# Patient Record
Sex: FEMALE | Race: WHITE | NOT HISPANIC OR LATINO | Employment: FULL TIME | URBAN - METROPOLITAN AREA
[De-identification: names, ages, dates, MRNs, and addresses within clinical notes are randomized per-mention and may not be internally consistent; named-entity substitution may affect disease eponyms.]

---

## 2017-06-19 ENCOUNTER — TRANSCRIBE ORDERS (OUTPATIENT)
Dept: ADMINISTRATIVE | Facility: HOSPITAL | Age: 42
End: 2017-06-19

## 2017-06-19 ENCOUNTER — TRANSCRIBE ORDERS (OUTPATIENT)
Dept: INTERNAL MEDICINE | Facility: CLINIC | Age: 42
End: 2017-06-19

## 2017-06-19 DIAGNOSIS — Z12.31 VISIT FOR SCREENING MAMMOGRAM: Primary | ICD-10-CM

## 2017-07-12 ENCOUNTER — APPOINTMENT (OUTPATIENT)
Dept: MAMMOGRAPHY | Facility: HOSPITAL | Age: 42
End: 2017-07-12

## 2017-07-13 ENCOUNTER — HOSPITAL ENCOUNTER (OUTPATIENT)
Dept: MAMMOGRAPHY | Facility: HOSPITAL | Age: 42
Discharge: HOME | End: 2017-07-13
Admitting: INTERNAL MEDICINE

## 2017-07-13 DIAGNOSIS — Z12.31 VISIT FOR SCREENING MAMMOGRAM: ICD-10-CM

## 2017-07-13 PROCEDURE — G0202 SCR MAMMO BI INCL CAD: HCPCS

## 2018-06-04 ENCOUNTER — TRANSCRIBE ORDERS (OUTPATIENT)
Dept: ADMINISTRATIVE | Facility: HOSPITAL | Age: 43
End: 2018-06-04

## 2018-06-04 DIAGNOSIS — Z12.31 VISIT FOR SCREENING MAMMOGRAM: Primary | ICD-10-CM

## 2018-07-20 ENCOUNTER — HOSPITAL ENCOUNTER (OUTPATIENT)
Dept: MAMMOGRAPHY | Facility: HOSPITAL | Age: 43
Discharge: HOME | End: 2018-07-20
Admitting: INTERNAL MEDICINE

## 2018-07-20 DIAGNOSIS — Z12.31 VISIT FOR SCREENING MAMMOGRAM: ICD-10-CM

## 2018-07-20 PROCEDURE — 77067 SCR MAMMO BI INCL CAD: CPT

## 2019-02-27 ENCOUNTER — TELEPHONE (OUTPATIENT)
Dept: INTERNAL MEDICINE | Facility: CLINIC | Age: 44
End: 2019-02-27

## 2019-02-27 NOTE — TELEPHONE ENCOUNTER
Pt states she knows that she hasn't seen you in a while.       She was doing 50mg of zoloft and went ahead and started taking 100mg.   Was just told this weekend her  wants a divorce, he has been seeing somebody since October, she has three children.     Her anxiety is izabel high, she is visibly shaking most of the time.

## 2019-02-27 NOTE — TELEPHONE ENCOUNTER
Pt informed, via voicemail, that we have a 9 am, went a head and scheduled her so she would have the apt, but informed she could call back and cancel or reschedule at any time without penalty.

## 2019-02-28 ENCOUNTER — OFFICE VISIT (OUTPATIENT)
Dept: INTERNAL MEDICINE | Facility: CLINIC | Age: 44
End: 2019-02-28

## 2019-02-28 VITALS
HEART RATE: 75 BPM | DIASTOLIC BLOOD PRESSURE: 64 MMHG | RESPIRATION RATE: 18 BRPM | SYSTOLIC BLOOD PRESSURE: 104 MMHG | HEIGHT: 72 IN | OXYGEN SATURATION: 99 % | BODY MASS INDEX: 22.21 KG/M2 | WEIGHT: 164 LBS

## 2019-02-28 DIAGNOSIS — F43.23 SITUATIONAL MIXED ANXIETY AND DEPRESSIVE DISORDER: Primary | ICD-10-CM

## 2019-02-28 PROCEDURE — 99213 OFFICE O/P EST LOW 20 MIN: CPT | Performed by: INTERNAL MEDICINE

## 2019-02-28 RX ORDER — HYDROXYZINE HYDROCHLORIDE 25 MG/1
25 TABLET, FILM COATED ORAL 3 TIMES DAILY PRN
Qty: 30 TABLET | Refills: 3 | Status: SHIPPED | OUTPATIENT
Start: 2019-02-28

## 2019-02-28 RX ORDER — SERTRALINE HYDROCHLORIDE 100 MG/1
150 TABLET, FILM COATED ORAL DAILY
Qty: 45 TABLET | Refills: 3 | Status: SHIPPED | OUTPATIENT
Start: 2019-02-28 | End: 2019-07-19 | Stop reason: SDUPTHER

## 2019-02-28 RX ORDER — ALPRAZOLAM 0.25 MG/1
0.25 TABLET ORAL EVERY 6 HOURS PRN
Qty: 3 TABLET | Refills: 2 | Status: SHIPPED | OUTPATIENT
Start: 2019-02-28

## 2019-02-28 NOTE — PROGRESS NOTES
Chief Complaint  Mary Chandra is a 43 y.o. female who presents for Anxiety and Depression  .    History of Present Illness   Mary is here for acute care.  She has had a very emotionally traumatic weekend.  Her  out of the blue told her he was cheating on her and asked for a divorce.   They have been  17 years, have three kids.  They had been doing some counselling but she didn't think this was anywhere on the radar.  She feels completely blindsided.  She has been on sertraline and is wondering if we need to increase it or add something.  She is having near panic attacks.  She can't sleep. She can't eat.      Review of Systems   Psychiatric/Behavioral: Positive for depression. The patient has insomnia and is nervous/anxious.        Patient Active Problem List   Diagnosis   • Anxiety   • Chronic constipation   • BRBPR (bright red blood per rectum)   • Frequent headaches   • Situational mixed anxiety and depressive disorder       Past Medical History:   Diagnosis Date   • Anxiety    • Chronic constipation    • Headache    • Precancerous skin lesion     Dr. Andino       Past Surgical History:   Procedure Laterality Date   • BUNIONECTOMY Right        Family History   Problem Relation Age of Onset   • No Known Problems Mother    • Heart disease Father         pacemaker   • Ovarian cancer Cousin        Social History     Socioeconomic History   • Marital status:      Spouse name: Not on file   • Number of children: Not on file   • Years of education: Not on file   • Highest education level: Not on file   Social Needs   • Financial resource strain: Not on file   • Food insecurity - worry: Not on file   • Food insecurity - inability: Not on file   • Transportation needs - medical: Not on file   • Transportation needs - non-medical: Not on file   Occupational History   • Not on file   Tobacco Use   • Smoking status: Never Smoker   Substance and Sexual Activity   • Alcohol use: Yes     Comment:  "socially   • Drug use: No   • Sexual activity: Not on file   Other Topics Concern   • Not on file   Social History Narrative   • Not on file       Current Outpatient Medications on File Prior to Visit   Medication Sig Dispense Refill   • [DISCONTINUED] sertraline (ZOLOFT) 100 MG tablet 50 mg. Take half tab daily        No current facility-administered medications on file prior to visit.        No Known Allergies    Vitals:    02/28/19 0921   BP: 104/64   Pulse: 75   Resp: 18   SpO2: 99%   Weight: 74.4 kg (164 lb)   Height: 182.9 cm (72\")       Body mass index is 22.24 kg/m².    Objective   Physical Exam   Constitutional: She is oriented to person, place, and time. She appears well-developed and well-nourished. No distress.   HENT:   Head: Normocephalic and atraumatic.   Neurological: She is alert and oriented to person, place, and time. No cranial nerve deficit.   Psychiatric: Her speech is normal and behavior is normal. Judgment and thought content normal. Cognition and memory are normal. She exhibits a depressed mood.       Results for orders placed or performed in visit on 07/06/16   Comprehensive metabolic panel   Result Value Ref Range    Glucose 77 65 - 99 mg/dL    BUN 13 6 - 20 mg/dL    Creatinine 0.72 0.57 - 1.00 mg/dL    eGFR Non African Am 90 >60 mL/min/1.73    eGFR African Am 109 >60 mL/min/1.73    BUN/Creatinine Ratio 18.1 7.0 - 25.0    Sodium 142 136 - 145 mmol/L    Potassium 4.7 3.5 - 5.2 mmol/L    Chloride 103 98 - 107 mmol/L    Total CO2 25.0 22.0 - 29.0 mmol/L    Calcium 9.7 8.6 - 10.5 mg/dL    Total Protein 7.2 6.0 - 8.5 g/dL    Albumin 4.80 3.50 - 5.20 g/dL    Globulin 2.4 gm/dL    A/G Ratio 2.0 g/dL    Total Bilirubin 0.6 0.1 - 1.2 mg/dL    Alkaline Phosphatase 65 39 - 117 U/L    AST (SGOT) 18 1 - 32 U/L    ALT (SGPT) 13 1 - 33 U/L   Hemoglobin A1c   Result Value Ref Range    Hemoglobin A1C 4.84 4.80 - 5.60 %   Lipid panel   Result Value Ref Range    Total Cholesterol 215 (H) 0 - 200 mg/dL    " Triglycerides 80 0 - 150 mg/dL    HDL Cholesterol 89 (H) 40 - 60 mg/dL    VLDL Cholesterol 16 5 - 40 mg/dL    LDL Cholesterol  110 (H) 0 - 100 mg/dL   TSH   Result Value Ref Range    TSH 2.190 0.270 - 4.200 mIU/mL   Vitamin B12   Result Value Ref Range    Vitamin B-12 542 211 - 946 pg/mL   Vitamin D 25 hydroxy   Result Value Ref Range    25 Hydroxy, Vitamin D 44.0 ng/mL   Urinalysis w/ Culture if Indicated   Result Value Ref Range    Specific Gravity, UA 1.013 1.005 - 1.030    pH, UA 7.5 5.0 - 7.5    Color, UA Yellow Yellow    Appearance, UA Clear Clear    Leukocytes, UA Negative Negative    Protein Negative Negative/Trace    Glucose, UA Negative Negative    Ketones Negative Negative    Blood, UA Negative Negative    Bilirubin, UA Negative Negative    Urobilinogen, UA 0.2 0.2 - 1.0 mg/dL    Nitrite, UA Negative Negative    Microscopic Examination Comment     Microscopic Examination See below:     Urinalysis Reflex Comment    Microscopic Examination   Result Value Ref Range    WBC 0-5 0 - 5 /hpf    RBC, UA 0-2 0 - 2 /hpf    Epithelial Cells (non renal) 0-10 0 - 10 /hpf    Mucus, UA Present Not Estab. /hpf    Bacteria, UA Few None seen/Few /hpf   CBC and Differential   Result Value Ref Range    WBC 7.08 4.50 - 10.70 10*3/mm3    RBC 4.59 3.90 - 5.20 10*6/mm3    Hemoglobin 14.5 11.9 - 15.5 g/dL    Hematocrit 44.7 35.6 - 45.5 %    MCV 97.4 80.5 - 98.2 fL    MCH 31.6 26.9 - 32.7 pg    MCHC 32.4 32.4 - 36.3 g/dL    RDW 12.7 11.7 - 13.0 %    Platelets 250 140 - 500 10*3/mm3    Neutrophil Rel % 57.2 42.7 - 76.0 %    Lymphocyte Rel % 34.7 19.6 - 45.3 %    Monocyte Rel % 6.2 5.0 - 12.0 %    Eosinophil Rel % 1.6 0.3 - 6.2 %    Basophil Rel % 0.3 0.0 - 1.5 %    Neutrophils Absolute 4.05 1.90 - 8.10 10*3/mm3    Lymphocytes Absolute 2.46 0.90 - 4.80 10*3/mm3    Monocytes Absolute 0.44 0.20 - 1.20 10*3/mm3    Eosinophils Absolute 0.11 0.00 - 0.70 10*3/mm3    Basophils Absolute 0.02 0.00 - 0.20 10*3/mm3    Immature Granulocyte Rel %  0.0 0.0 - 0.5 %    Immature Grans Absolute 0.00 0.00 - 0.03 10*3/mm3       Assessment/Plan   Diagnoses and all orders for this visit:    Situational mixed anxiety and depressive disorder    Other orders  -     sertraline (ZOLOFT) 100 MG tablet; Take 1.5 tablets by mouth Daily. Take half tab daily  -     hydrOXYzine (ATARAX) 25 MG tablet; Take 1 tablet by mouth 3 (Three) Times a Day As Needed for Anxiety.  -     ALPRAZolam (XANAX) 0.25 MG tablet; Take 1 tablet by mouth Every 6 (Six) Hours As Needed for Anxiety.        Discussion/Summary  Mary is here for acute care.  We are going to increase her sertraline from 100 to 150.  I have send in hydroxyzine for sleep/anxiety at night.  We elected to send in just three xanax in the event of a full blown panic attack.  She understands the highly addictive nature of these and with three teenagers in the house does not want to have these around.  Controlled substance agreement signed today.  She has a very good support system of family and friends.  I feel confident Mary will get through this but as we discussed today it's going to take time.    No Follow-up on file.

## 2019-07-19 RX ORDER — SERTRALINE HYDROCHLORIDE 100 MG/1
150 TABLET, FILM COATED ORAL DAILY
Qty: 45 TABLET | Refills: 1 | Status: SHIPPED | OUTPATIENT
Start: 2019-07-19 | End: 2019-07-19 | Stop reason: SDUPTHER

## 2019-07-19 RX ORDER — SERTRALINE HYDROCHLORIDE 100 MG/1
TABLET, FILM COATED ORAL
Qty: 30 TABLET | Refills: 1 | Status: SHIPPED | OUTPATIENT
Start: 2019-07-19

## 2019-10-28 RX ORDER — SERTRALINE HYDROCHLORIDE 100 MG/1
TABLET, FILM COATED ORAL
Qty: 45 TABLET | Refills: 0 | OUTPATIENT
Start: 2019-10-28

## 2020-04-28 RX ORDER — SERTRALINE HYDROCHLORIDE 100 MG/1
TABLET, FILM COATED ORAL
Qty: 30 TABLET | Refills: 0 | OUTPATIENT
Start: 2020-04-28

## 2021-02-05 ENCOUNTER — APPOINTMENT (OUTPATIENT)
Dept: WOMENS IMAGING | Facility: HOSPITAL | Age: 46
End: 2021-02-05

## 2021-02-05 PROCEDURE — 77067 SCR MAMMO BI INCL CAD: CPT | Performed by: RADIOLOGY

## 2022-03-25 ENCOUNTER — APPOINTMENT (OUTPATIENT)
Dept: WOMENS IMAGING | Facility: HOSPITAL | Age: 47
End: 2022-03-25

## 2022-03-25 PROCEDURE — 77063 BREAST TOMOSYNTHESIS BI: CPT | Performed by: RADIOLOGY

## 2022-03-25 PROCEDURE — 77067 SCR MAMMO BI INCL CAD: CPT | Performed by: RADIOLOGY
